# Patient Record
Sex: FEMALE | Race: WHITE
[De-identification: names, ages, dates, MRNs, and addresses within clinical notes are randomized per-mention and may not be internally consistent; named-entity substitution may affect disease eponyms.]

---

## 2019-03-29 ENCOUNTER — HOSPITAL ENCOUNTER (EMERGENCY)
Dept: HOSPITAL 31 - C.ER | Age: 44
Discharge: HOME | End: 2019-03-29
Payer: SELF-PAY

## 2019-03-29 VITALS
DIASTOLIC BLOOD PRESSURE: 86 MMHG | TEMPERATURE: 98.9 F | HEART RATE: 90 BPM | RESPIRATION RATE: 18 BRPM | SYSTOLIC BLOOD PRESSURE: 145 MMHG

## 2019-03-29 VITALS — OXYGEN SATURATION: 100 %

## 2019-03-29 VITALS — BODY MASS INDEX: 30.7 KG/M2

## 2019-03-29 DIAGNOSIS — K21.9: ICD-10-CM

## 2019-03-29 DIAGNOSIS — G43.909: Primary | ICD-10-CM

## 2019-03-29 DIAGNOSIS — J11.1: ICD-10-CM

## 2019-03-29 LAB
ALBUMIN SERPL-MCNC: 4.4 {NULL, G/DL} (ref 3.5–5)
ALBUMIN/GLOB SERPL: 1.4 {NULL, NULL} (ref 1–2.1)
ALT SERPL-CCNC: 51 {NULL, U/L} (ref 9–52)
AST SERPL-CCNC: 36 {NULL, U/L} (ref 14–36)
BASE EXCESS BLDV CALC-SCNC: 3.8 {NULL, MMOL/L} (ref 0–2)
BASOPHILS # BLD AUTO: 0 {NULL, K/UL} (ref 0–0.2)
BASOPHILS NFR BLD: 0.5 {NULL, %} (ref 0–2)
BUN SERPL-MCNC: 15 {NULL, MG/DL} (ref 7–17)
CALCIUM SERPL-MCNC: 10 {NULL, MG/DL} (ref 8.6–10.4)
EOSINOPHIL # BLD AUTO: 0.3 {NULL, K/UL} (ref 0–0.7)
EOSINOPHIL NFR BLD: 3 {NULL, %} (ref 0–4)
ERYTHROCYTE [DISTWIDTH] IN BLOOD BY AUTOMATED COUNT: 13.5 {NULL, %} (ref 11.5–14.5)
GFR NON-AFRICAN AMERICAN: > 60 {NULL, NULL}
HGB BLD-MCNC: 13.6 {NULL, G/DL} (ref 11–16)
LIPASE: 88 {NULL, U/L} (ref 23–300)
LYMPHOCYTES # BLD AUTO: 4.1 {NULL, K/UL} (ref 1–4.3)
LYMPHOCYTES NFR BLD AUTO: 48.3 {NULL, %} (ref 20–40)
MCH RBC QN AUTO: 30.4 {NULL, PG} (ref 27–31)
MCHC RBC AUTO-ENTMCNC: 33.8 {NULL, G/DL} (ref 33–37)
MCV RBC AUTO: 89.8 {NULL, FL} (ref 81–99)
MONOCYTES # BLD: 0.6 {NULL, K/UL} (ref 0–0.8)
MONOCYTES NFR BLD: 7.5 {NULL, %} (ref 0–10)
NEUTROPHILS # BLD: 3.4 {NULL, K/UL} (ref 1.8–7)
NEUTROPHILS NFR BLD AUTO: 40.7 {NULL, %} (ref 50–75)
NRBC BLD AUTO-RTO: 0 {NULL, %} (ref 0–2)
PCO2 BLDV: 46 {NULL, MMHG} (ref 40–60)
PH BLDV: 7.41 {NULL, NULL} (ref 7.32–7.43)
PLATELET # BLD: 311 {NULL, K/UL} (ref 130–400)
PMV BLD AUTO: 8.1 {NULL, FL} (ref 7.2–11.7)
RBC # BLD AUTO: 4.47 {NULL, MIL/UL} (ref 3.8–5.2)
VENOUS BLOOD FIO2: 21 {NULL, %}
VENOUS BLOOD GAS PO2: 44 {NULL, MM/HG} (ref 30–55)
WBC # BLD AUTO: 8.4 {NULL, K/UL} (ref 4.8–10.8)

## 2019-03-29 PROCEDURE — 80053 COMPREHEN METABOLIC PANEL: CPT

## 2019-03-29 PROCEDURE — 71046 X-RAY EXAM CHEST 2 VIEWS: CPT

## 2019-03-29 PROCEDURE — 83690 ASSAY OF LIPASE: CPT

## 2019-03-29 PROCEDURE — 82803 BLOOD GASES ANY COMBINATION: CPT

## 2019-03-29 PROCEDURE — 96375 TX/PRO/DX INJ NEW DRUG ADDON: CPT

## 2019-03-29 PROCEDURE — 85025 COMPLETE CBC W/AUTO DIFF WBC: CPT

## 2019-03-29 PROCEDURE — 87430 STREP A AG IA: CPT

## 2019-03-29 PROCEDURE — 96374 THER/PROPH/DIAG INJ IV PUSH: CPT

## 2019-03-29 PROCEDURE — 87804 INFLUENZA ASSAY W/OPTIC: CPT

## 2019-03-29 PROCEDURE — 99284 EMERGENCY DEPT VISIT MOD MDM: CPT

## 2019-03-29 PROCEDURE — 93005 ELECTROCARDIOGRAM TRACING: CPT

## 2019-03-29 PROCEDURE — 70450 CT HEAD/BRAIN W/O DYE: CPT

## 2019-03-29 PROCEDURE — 87070 CULTURE OTHR SPECIMN AEROBIC: CPT

## 2019-03-29 NOTE — C.PDOC
History Of Present Illness


 43 yr old female w/ no ppmhx p/w headache, blood tinged sputum, and abdominal 

pain. Pt notes headache started 6 months ago, throbbing, without radiation, not 

sudden or worst of life or "thunderclap" like. No activity associated prior to 

HA. No fall or trauma. NO neck stiffness or fever. Pt also notes mild nasal 

congestion x3d. She notes that she has had this headache before and was seen at 

[facility] and given medications which she doesnt remember the name of 

which improved the headache. She notes coming in today because she ran out of 

the medications for the headache and came in today. She denies any FND. She also

notes x1 week of intermittent epigastric pain, previously dx as gerd. She notes 

a burning sensation in her stomach when she eats certain foods. She also notes 

blood tinged sputum with her nasal congestion over the past couple of days. No 

hemoptysis or night sweats or recent travel abroad. No body aches or sick 

contacts. 





No constipaiton or diarrhea. No dark or bloody stool 


No enuresis or encoparesis 


No back pain


No urinary complaints


No rashes


NO SI HI or depression





Time Seen by Provider: 19 19:03


Chief Complaint (Nursing): Headache





Past Medical History


Vital Signs: 





                                Last Vital Signs











Temp  99.3 F   19 18:19


 


Pulse  58 L  19 18:19


 


Resp  20   19 18:19


 


BP  142/86   19 18:19


 


Pulse Ox  100   19 18:19











Family History: States: Unknown Family Hx





- Social History


Hx Tobacco Use: No


Hx Alcohol Use: Yes


Hx Substance Use: No





- Immunization History


Hx Tetanus Toxoid Vaccination: No


Hx Influenza Vaccination: No


Hx Pneumococcal Vaccination: No





Review Of Systems


Constitutional: Negative for: Fever, Chills, Sweats, Weakness, Malaise


Eyes: Negative for: Pain, Vision Change, Conjunctivae Inflammation


ENT: Positive for: Nose Congestion.  Negative for: Ear Pain, Ear Discharge, Nose

Pain, Nose Discharge, Mouth Pain, Mouth Swelling, Throat Pain, Throat Swelling


Cardiovascular: Negative for: Chest Pain, Palpitations, Orthopnea, Edema, Light 

Headedness


Respiratory: Positive for: Sputum (blood tinged intermittently).  Negative for: 

Cough, Shortness of Breath, SOB with Excertion, Pleuritic Pain


Gastrointestinal: Positive for: Abdominal Pain.  Negative for: Nausea, Vomiting,

Diarrhea, Constipation, Melena, Hematochezia, Hematemesis


Genitourinary: Negative for: Dysuria, Frequency, Hematuria, Vaginal Discharge, 

Vaginal Bleeding


Musculoskeletal: Negative for: Neck Pain, Shoulder Pain, Back Pain, Hand Pain


Skin: Negative for: Rash, Lesions


Neurological: Negative for: Weakness, Numbness, Incoordination, Confusion, 

Seizures, Altered Mental Status, Headache


Psych: Negative for: Anxiety





Physical Exam





- Physical Exam


Appears: Well, Non-toxic, No Acute Distress


Skin: Normal Color, Warm, No Pale, No Rash


Head: Atraumatic, Normacephalic, No Swelling, No Abrasion, No Laceration


Eye(s): bilateral: Normal Inspection, PERRL, EOMI


Ear(s): Bilateral: Normal


Nose: Normal, No Flaring, No Discharge, No Epistaxis, No Deformity, No 

Tenderness, No Septal Hematoma


Oral Mucosa: Moist


Tongue: Normal Appearing


Lips: Normal Appearing


Teeth: Normal Dentition


Gingiva: Normal Appearing


Throat: Normal, No Erythema, No Exudate, No Drooling, No Mass


Neck: Normal, Normal ROM, No Midline Cervical Tenderness, Supple, Other (no 

meningeal signs)


Lymphatic: Normal Exam, No Adenopathy


Chest: Symmetrical, No Deformity, No Tenderness


Cardiovascular: Rhythm Regular, No Friction Rub, No Murmur, No JVD


Respiratory: Normal Breath Sounds, No Rales, No Rhonchi, No Stridor, No Wheezing


Gastrointestinal/Abdominal: Normal Exam, Soft, No Tenderness, No Organomegaly, 

No Mass


Back: Normal Inspection, No CVA Tenderness, No Vertebral Tenderness, No Decrea

sed ROM


Extremity: Normal ROM, No Tenderness


Extremity: Bilateral: Atraumatic


Neurological/Psych: Oriented x3, Normal Speech, Normal Cognition, Normal Cranial

Nerves, No Cerebellar Signs, Normal Motor, Normal Sensation


Gait: Steady


Extremity: Right: No Drift, Left: No Drift





ED Course And Treatment





- Laboratory Results


Result Diagrams: 


                                 19 19:32





                                 19 19:32


O2 Sat by Pulse Oximetry: 100





Medical Decision Making


Medical Decision Makin yr old F p/w headache, epigastric pain, blood tinged sputum. Pt in NAD. HA 

likely chronic migraines, has been seen at outpt clinic and given meds w/ good 

resolution. NO indication of SAH given not sudden, not worst of life and without

exertion prior to headache starting. No FND or meningeal signs. Abd non-ttp, 

epigastric pain w/ hx of gerd w/ out back pain. NO vaginal d/c or rash or trau

ma/ Blood tinged sputum likely related to URI. 





PEnding imaging and labs 








labs largely unremarkable


pending imaging 


no appreciate CT abnl on my exam 


no appreciate Xray abnl on my exam








EK, nsr, no stemi


pt in nad, repeat neuro exam unremarkable, remains w/ out meningeal signs 


no sinus tenderness


abdomen now non-ttp 


pt notes pain fully resolved


negative flu, however high clinical suspicion given nasal congestion and body 

aches, will rx. 


clear for d/c home with return indications and f/u 


pt agreeable to plan. 








Disposition





- Disposition


Referrals: 


Garrick Lopez MD [Staff Provider] - 


Champ Quinn MD [Staff Provider] - 


[facility] [Outside]


[facility] [Outside]


[facility] [Outside]


Disposition Time: 21:46


Condition: STABLE


Additional Instructions: 





ZACARIAS NAVARRO, thank you for letting us take care of you today. Your 

provider was Fernando King and you were treated for HEADACHE. The emergency medical 

care you received today was directed at your acute symptoms. If you were prescr

ibed any medication, please fill it and take as directed. It may take several 

days for your symptoms to resolve. Return to the Emergency Department if your 

symptoms worsen, do not improve, or if you have any other problems.





Please contact your doctor or call one of the physicians/clinics you have been 

referred to that are listed on the Patient Visit Information form that is 

included in your discharge packet. Bring any paperwork you were given at 

discharge with you along with any medications you are taking to your follow up 

visit. Our treatment cannot replace ongoing medical care by a primary care 

provider outside of the emergency department.





Thank you for allowing the [facility] team to be part of your care today.








If you had an X-Ray or CT scan: A Radiologist will review the ED reading if any 

change in treatment is needed we will contact you.***





If you had a blood, urine, or wound culture: It will take several days for the 

results, if any change in treatment is needed we will contact you.***





If you had an STI test: It will take 48 hours for the results. Please call after

 1 week if you have not heard back.***


Prescriptions: 


Famotidine [Pepcid] 20 mg PO Q12H PRN 3 Days #6 tab


 PRN Reason: Dyspepsia


Oseltamivir Cap [Tamiflu] 75 mg PO BID 5 Days #10 cap


Instructions:  Viral Tests, Flu, Adult (DC), Acid Reflux (Gastroesophageal 

Reflux Disease), Adult (DC), Migraine Headache (DC), Headache, Adult (DC)


Forms:  [employer] (English)


Print Language: Romansh





- Clinical Impression


Clinical Impression: 


 Headache, Migraine, GERD (gastroesophageal reflux disease), Flu-like symptoms

## 2019-03-30 NOTE — RAD
Date of service: 



03/29/2019



HISTORY:

 sputum 



COMPARISON:

No prior.



TECHNIQUE:

Chest PA and lateral views



FINDINGS:



LUNGS:

No active pulmonary disease.



PLEURA:

No significant pleural effusion identified. No pneumothorax apparent.



CARDIOVASCULAR:

No aortic atherosclerotic calcification present.



Normal cardiac size. No pulmonary vascular congestion. 



OSSEOUS STRUCTURES:

No significant abnormalities.



VISUALIZED UPPER ABDOMEN:

Normal.



OTHER FINDINGS:

None.



IMPRESSION:

No active disease.

## 2019-03-30 NOTE — CT
Date of service: 



03/29/2019



PROCEDURE:  CT HEAD WITHOUT CONTRAST.



HISTORY:

ha



COMPARISON:

None available.



TECHNIQUE:

Axial computed tomography images were obtained through the head/brain 

without intravenous contrast.  



Radiation dose:



Total exam DLP = 939.52 mGy-cm.



This CT exam was performed using one or more of the following dose 

reduction techniques: Automated exposure control, adjustment of the 

mA and/or kV according to patient size, and/or use of iterative 

reconstruction technique.



FINDINGS:



HEMORRHAGE:

No intracranial hemorrhage. 



BRAIN:

No mass effect or edema.  There is focal calcification noted at the 

right posterior parietal lobe which adjacent focal hypodensity noted. 

 Findings may represent sequela of prior infection or inflammatory 

process.  No evidence of mass effect on the adjacent structures.



VENTRICLES:

Unremarkable. No hydrocephalus. 



CALVARIUM:

Unremarkable.



PARANASAL SINUSES:

Unremarkable as visualized. No significant inflammatory changes.



MASTOID AIR CELLS:

Unremarkable as visualized. No inflammatory changes.



OTHER FINDINGS:

None.



IMPRESSION:

No evidence of acute intracranial hemorrhage mass effect or midline 

shift.  Focal calcification and hypodensity at the right posterior 

parietal lobe may represent a sequela of prior infection or 

inflammatory process versus old trauma.







Preliminary report was submitted by [facility] contains concordant 

findings.

## 2019-04-01 NOTE — CARD
--------------- APPROVED REPORT --------------





Date of service: 03/29/2019



EKG Measurement

Heart [id number]

NC [id number]

[id number]

[id number]

[id number]



<Conclusion>

Normal sinus rhythm

Normal ECG